# Patient Record
Sex: MALE | Race: WHITE
[De-identification: names, ages, dates, MRNs, and addresses within clinical notes are randomized per-mention and may not be internally consistent; named-entity substitution may affect disease eponyms.]

---

## 2017-10-08 NOTE — EDM.PDOC
ED HPI GENERAL MEDICAL PROBLEM





- General


Chief Complaint: Neurological Problem


Stated Complaint: STROKE???


Time Seen by Provider: 10/08/17 13:37


Source of Information: Reports: Patient, Family, RN Notes Reviewed


History Limitations: Reports: No Limitations





- History of Present Illness


INITIAL COMMENTS - FREE TEXT/NARRATIVE: 





51-year-old gentleman presents emergency department today with facial droop on 

the right side he states it came on suddenly about 2 hours prior he noticed 

that he's had difficulty with his right eye being dry and he has started to 

drool of the corner of his mouth he denies any other symptoms no weakness in 

the extremities is able to speak without difficulty he does admit to being in 

the woods but he denies any tick exposure





- Related Data


 Allergies











Allergy/AdvReac Type Severity Reaction Status Date / Time


 


No Known Allergies Allergy   Verified 10/08/17 13:33











Home Meds: 


 Home Meds





Hydrochlorothiazide 25 mg PO DAILY 10/08/17 [History]


amLODIPine [Norvasc] 5 mg PO DAILY 10/08/17 [History]











Past Medical History


Cardiovascular History: Reports: Hypertension





- Infectious Disease History


Infectious Disease History: Reports: Chicken Pox, Measles, Mumps





- Past Surgical History


Musculoskeletal Surgical History: Reports: Arthroscopic Knee, Hip Replacement, 

Joint Replacement, Shoulder Surgery





Social & Family History





- Caffeine Use


Caffeine Use: Reports: Coffee





- Recreational Drug Use


Recreational Drug Use: No





ED ROS GENERAL





- Review of Systems


Review Of Systems: See Below


Constitutional: Reports: No Symptoms


HEENT: Reports: Eye Pain (Right side)


Respiratory: Reports: No Symptoms


Cardiovascular: Reports: No Symptoms


GI/Abdominal: Reports: No Symptoms


Musculoskeletal: Reports: No Symptoms


Neurological: Reports: Other (Facial droop right side).  Denies: Trouble 

Speaking, Change in Speech





ED EXAM, NEURO





- Physical Exam


Exam: See Below


Text/Narrative:: 





Examination of the face difficult to appreciate any asymmetry until he smiles 

slight facial droop is noted ears tympanic membranes clear landmarks and light 

reflex bilaterally canals are clear eyes pupils equal round reactive cranial 

nerves III is appropriate both eyelids are open extraocular eye movements are 

intact, no abnormalities noted in 4 or 6 bilaterally, cranial or 5 full range 

of motion of muscles of mastication without difficulty cranial nerve VII does 

have facial droop appreciated on the right side there is weakness in closure of 

the eyelid on the right side he can close it however it is easily opened with 

slight amount of pressure. Frowning of the forehead is appreciated and is 

spared on both sides no other abnormalities noted remainder of the cranial 

nerves


Exam Limited By: No Limitations


General Appearance: Alert, WD/WN, No Apparent Distress


Respiratory/Chest: No Respiratory Distress


Neurological: Alert, Normal Mood/Affect, Normal Gait





Course





- Vital Signs


Last Recorded V/S: 





 Last Vital Signs











Temp  97.3 F   10/08/17 13:40


 


Pulse  95   10/08/17 13:40


 


Resp  18   10/08/17 13:40


 


BP  165/96 H  10/08/17 13:40


 


Pulse Ox  95   10/08/17 13:40














Departure





- Departure


Time of Disposition: 14:03


Disposition: Home, Self-Care 01


Condition: Good


Clinical Impression: 


 Right-sided Bell's palsy








- Discharge Information


Referrals: 


PCP,None [Primary Care Provider] - 


Additional Instructions: 


Take full course of steroids, take full course of Valtrex, please use an 

artificial eye lubricant, please follow-up with your primary care provider upon 

return home and consider a Lyme titer, call return to the emergency department 

worsening of symptoms





- Assessment/Plan


Plan: 





Assessment





Acuity = acute





Site and laterality = Bell's palsy right side  grade 2





Etiology  = unclear etiology concern for tickborne illness





Manifestations = dry eye right side





Location of injury =  Home





Lab values = none





Plan


Prescription written for tapering dose of prednisone starting at 60 mg per day 

also Valtrex thousand milligrams by mouth 3 times a day 7 and follow-up with 

his primary care provider upon return home for further evaluation

















Patient was in agreement with the plan all questions were answered, they were 

instructed to return to the emergency department or call for worsening 

symptoms.  This note was dictated using dragon voice recognition software 

please call with any questions.

## 2023-09-03 ENCOUNTER — HOSPITAL ENCOUNTER (EMERGENCY)
Dept: HOSPITAL 11 - JP.ED | Age: 58
Discharge: HOME | End: 2023-09-03
Payer: COMMERCIAL

## 2023-09-03 VITALS — DIASTOLIC BLOOD PRESSURE: 84 MMHG | HEART RATE: 89 BPM | SYSTOLIC BLOOD PRESSURE: 139 MMHG

## 2023-09-03 DIAGNOSIS — Z79.899: ICD-10-CM

## 2023-09-03 DIAGNOSIS — G45.4: Primary | ICD-10-CM

## 2023-09-03 DIAGNOSIS — I10: ICD-10-CM

## 2023-09-03 LAB
ALBUMIN SERPL-MCNC: 4 G/DL (ref 3.4–5)
ALBUMIN/GLOB SERPL: 1.1 {RATIO} (ref 1.2–2.2)
ALP SERPL-CCNC: 70 U/L (ref 46–116)
ALT SERPL-CCNC: 42 U/L (ref 12–78)
ANION GAP SERPL CALC-SCNC: 16.7 MMOL/L (ref 5–14)
AST SERPL-CCNC: 21 U/L (ref 15–37)
BASOPHILS # BLD AUTO: 0.02 K/UL (ref 0–0.1)
BASOPHILS NFR BLD AUTO: 0.2 % (ref 0.1–1.3)
BILIRUB SERPL-MCNC: 0.6 MG/DL (ref 0.2–1)
BUN SERPL-MCNC: 18 MG/DL (ref 7–18)
CALCIUM SERPL-MCNC: 8.9 MG/DL (ref 8.5–10.1)
CHLORIDE SERPL-SCNC: 101 MMOL/L (ref 100–108)
CO2 SERPL-SCNC: 25 MMOL/L (ref 21–32)
CREAT CL 24H UR+SERPL-VRATE: 64.92 ML/MIN
CREAT SERPL-MCNC: 1.2 MG/DL (ref 0.8–1.3)
EOSINOPHIL # BLD AUTO: 0.02 K/UL (ref 0–0.4)
EOSINOPHIL NFR BLD AUTO: 0.2 % (ref 0–5.4)
GLOBULIN SER-MCNC: 3.8 G/DL (ref 2.3–3.5)
GLUCOSE SERPL-MCNC: 164 MG/DL (ref 74–106)
HCT VFR BLD AUTO: 42.6 % (ref 38.4–49.7)
HGB BLD-MCNC: 14.6 G/DL (ref 12.9–16.9)
IMM GRANULOCYTES # BLD: 0.07 K/UL (ref 0–0.23)
IMM GRANULOCYTES NFR BLD: 0.7 % (ref 0–0.7)
LYMPHOCYTES # BLD AUTO: 1.27 K/UL (ref 0.8–3.3)
LYMPHOCYTES NFR BLD AUTO: 12 % (ref 11.4–47.7)
MCH RBC QN AUTO: 28.3 PG (ref 31.6–35.5)
MCHC RBC AUTO-ENTMCNC: 34.3 G/DL (ref 31.6–35.5)
MCHC RBC AUTO-ENTMCNC: 82.7 FL (ref 81.4–99)
MONOCYTES # BLD AUTO: 0.5 K/UL (ref 0.2–0.9)
MONOCYTES NFR BLD AUTO: 4.7 % (ref 3.3–12.6)
NEUTROPHILS # BLD AUTO: 8.72 K/UL (ref 1–7.6)
NEUTROPHILS NFR BLD AUTO: 82.2 % (ref 40–78.1)
PLATELET # BLD AUTO: 186 K/UL (ref 130–375)
POTASSIUM SERPL-SCNC: 3.7 MMOL/L (ref 3.6–5.2)
PROT SERPL-MCNC: 7.8 G/DL (ref 6.4–8.2)
RBC # BLD AUTO: 5.15 M/UL (ref 4.14–5.76)
SODIUM SERPL-SCNC: 139 MMOL/L (ref 140–148)
WBC # BLD AUTO: 10.6 K/UL (ref 3.2–11)

## 2024-08-22 ENCOUNTER — HOSPITAL ENCOUNTER (EMERGENCY)
Dept: HOSPITAL 11 - JP.ED | Age: 59
Discharge: HOME | End: 2024-08-22
Payer: OTHER GOVERNMENT

## 2024-08-22 VITALS — SYSTOLIC BLOOD PRESSURE: 153 MMHG | HEART RATE: 111 BPM | DIASTOLIC BLOOD PRESSURE: 95 MMHG

## 2024-08-22 DIAGNOSIS — H53.2: Primary | ICD-10-CM

## 2024-08-22 DIAGNOSIS — Z79.84: ICD-10-CM

## 2024-08-22 DIAGNOSIS — I10: ICD-10-CM

## 2024-08-22 DIAGNOSIS — Z79.899: ICD-10-CM

## 2024-08-22 DIAGNOSIS — E11.9: ICD-10-CM

## 2024-08-22 DIAGNOSIS — Z86.16: ICD-10-CM

## 2024-08-22 PROCEDURE — 70450 CT HEAD/BRAIN W/O DYE: CPT

## 2024-08-22 PROCEDURE — 99284 EMERGENCY DEPT VISIT MOD MDM: CPT

## 2024-08-22 PROCEDURE — 70481 CT ORBIT/EAR/FOSSA W/DYE: CPT

## 2024-08-22 RX ADMIN — IOPAMIDOL SCH ML: 755 INJECTION, SOLUTION INTRAVENOUS at 15:20
